# Patient Record
Sex: MALE | Race: WHITE | NOT HISPANIC OR LATINO | Employment: OTHER | URBAN - NONMETROPOLITAN AREA
[De-identification: names, ages, dates, MRNs, and addresses within clinical notes are randomized per-mention and may not be internally consistent; named-entity substitution may affect disease eponyms.]

---

## 2023-08-30 PROBLEM — I10 HYPERTENSION: Status: ACTIVE | Noted: 2023-08-30

## 2023-08-30 PROBLEM — E78.5 HYPERLIPIDEMIA: Status: ACTIVE | Noted: 2023-08-30

## 2023-08-30 PROBLEM — C61 PROSTATE CANCER (MULTI): Status: ACTIVE | Noted: 2023-08-30

## 2023-08-30 PROBLEM — I48.0 PAROXYSMAL ATRIAL FIBRILLATION (MULTI): Status: ACTIVE | Noted: 2023-08-30

## 2023-08-30 RX ORDER — SILDENAFIL 25 MG/1
25 TABLET, FILM COATED ORAL AS NEEDED
COMMUNITY
Start: 2021-04-20 | End: 2024-02-29 | Stop reason: ALTCHOICE

## 2023-08-30 RX ORDER — LISINOPRIL 2.5 MG/1
1 TABLET ORAL DAILY
COMMUNITY
Start: 2021-02-04

## 2023-08-30 RX ORDER — METOPROLOL TARTRATE 100 MG/1
1 TABLET ORAL 2 TIMES DAILY
COMMUNITY
Start: 2021-02-04 | End: 2024-02-29 | Stop reason: ALTCHOICE

## 2023-08-30 RX ORDER — CETIRIZINE HYDROCHLORIDE 10 MG/1
1 TABLET ORAL DAILY
COMMUNITY

## 2023-08-30 RX ORDER — ASPIRIN 81 MG/1
1 TABLET ORAL DAILY
COMMUNITY

## 2023-08-30 RX ORDER — MONTELUKAST SODIUM 10 MG/1
1 TABLET ORAL DAILY
COMMUNITY
Start: 2021-02-04

## 2023-08-30 RX ORDER — ATORVASTATIN CALCIUM 20 MG/1
1 TABLET, FILM COATED ORAL DAILY
COMMUNITY
Start: 2021-02-04

## 2023-10-06 ENCOUNTER — OFFICE VISIT (OUTPATIENT)
Dept: CARDIOLOGY | Facility: CLINIC | Age: 68
End: 2023-10-06
Payer: MEDICARE

## 2023-10-06 VITALS
HEIGHT: 74 IN | BODY MASS INDEX: 24.26 KG/M2 | DIASTOLIC BLOOD PRESSURE: 70 MMHG | HEART RATE: 66 BPM | SYSTOLIC BLOOD PRESSURE: 118 MMHG | WEIGHT: 189 LBS

## 2023-10-06 DIAGNOSIS — E78.5 HYPERLIPIDEMIA, UNSPECIFIED HYPERLIPIDEMIA TYPE: ICD-10-CM

## 2023-10-06 DIAGNOSIS — I48.0 PAROXYSMAL ATRIAL FIBRILLATION (MULTI): Primary | ICD-10-CM

## 2023-10-06 DIAGNOSIS — C61 PROSTATE CANCER (MULTI): ICD-10-CM

## 2023-10-06 DIAGNOSIS — I10 PRIMARY HYPERTENSION: ICD-10-CM

## 2023-10-06 PROCEDURE — 3008F BODY MASS INDEX DOCD: CPT | Performed by: INTERNAL MEDICINE

## 2023-10-06 PROCEDURE — 3078F DIAST BP <80 MM HG: CPT | Performed by: INTERNAL MEDICINE

## 2023-10-06 PROCEDURE — 1036F TOBACCO NON-USER: CPT | Performed by: INTERNAL MEDICINE

## 2023-10-06 PROCEDURE — 99213 OFFICE O/P EST LOW 20 MIN: CPT | Performed by: INTERNAL MEDICINE

## 2023-10-06 PROCEDURE — 1159F MED LIST DOCD IN RCRD: CPT | Performed by: INTERNAL MEDICINE

## 2023-10-06 PROCEDURE — 3074F SYST BP LT 130 MM HG: CPT | Performed by: INTERNAL MEDICINE

## 2023-10-06 ASSESSMENT — PATIENT HEALTH QUESTIONNAIRE - PHQ9
2. FEELING DOWN, DEPRESSED OR HOPELESS: NOT AT ALL
SUM OF ALL RESPONSES TO PHQ9 QUESTIONS 1 AND 2: 0
1. LITTLE INTEREST OR PLEASURE IN DOING THINGS: NOT AT ALL

## 2023-10-06 NOTE — PROGRESS NOTES
Subjective   Miguel Ángel Sykes is a 67 y.o. male       Chief Complaint    Follow-up          HPI   Patient is here for follow-up continue management for paroxysmal atrial fibrillation, hypertension, hyperlipidemia.  Since last time I saw him he reports he is feeling quite well.  He denies any complaint of chest pain, palpitation, lightheadedness, dizziness or syncope.  He reports functional class I.  He has been treated for prostate cancer.  Unfortunately his wife passed away from complication related to metastatic disease.     ASSESSMENT:      1. Paroxysmal atrial fibrillation. No recurrence. No recurrence in several years.  He elected for aspirin therapy due to relatively low CHADS2 vascular score and no recurrence  2. Hypertension, controlled.  3. Previous report of palpitation, completely resolved.  4. Hyperlipidemia, we will try to retrieve his recent lab  5. prostate cancer stable  6.  Patient reports his wife passed away from complication related to metastatic cancer  RECOMMENDATION:      1. The patient will remain on the same medication.  2. The patient was advised to notify me any change in cardiac status or symptoms.  3. We discussed anticoagulation, the patient was elected for aspirin therapy.  4. I will see him back in the office in 1 year with an EKG   5. I  will try to retrieve his recent lab     Review of Systems   All other systems reviewed and are negative.               Objective   Physical Exam  Constitutional:       Appearance: Normal appearance. He is normal weight.   HENT:      Nose: Nose normal.   Neck:      Vascular: No carotid bruit.   Cardiovascular:      Rate and Rhythm: Normal rate.      Pulses: Normal pulses.      Heart sounds: Normal heart sounds.   Pulmonary:      Effort: Pulmonary effort is normal.   Abdominal:      General: Bowel sounds are normal.      Palpations: Abdomen is soft.   Genitourinary:     Rectum: Normal.   Musculoskeletal:         General: Normal range of motion.       "Cervical back: Normal range of motion.      Right lower leg: No edema.      Left lower leg: No edema.   Skin:     General: Skin is warm and dry.   Neurological:      General: No focal deficit present.      Mental Status: He is alert.   Psychiatric:         Mood and Affect: Mood normal.         Behavior: Behavior normal.         Thought Content: Thought content normal.         Judgment: Judgment normal.         Visit Vitals  /70 (BP Location: Left arm, Patient Position: Sitting)   Pulse 66   Ht 1.88 m (6' 2\")   Wt 85.7 kg (189 lb)   BMI 24.27 kg/m²   Smoking Status Never   BSA 2.12 m²                 Assessment/Plan   There are no diagnoses linked to this encounter.   1. Paroxysmal atrial fibrillation (CMS/HCC)        2. Hyperlipidemia, unspecified hyperlipidemia type        3. Primary hypertension        4. Body mass index (BMI) of 24.0 to 24.9 in adult        5. Prostate cancer (CMS/HCC)            "

## 2023-10-06 NOTE — PATIENT INSTRUCTIONS
Please bring all medicines, vitamins, and herbal supplements with you when you come to the office.    Prescriptions will not be filled unless you are compliant with your follow up appointments or have a follow up appointment scheduled as per instruction of your physician. Refills should be requested at the time of your visit.     Retrieve lab from  [facility]  One year with EKG

## 2023-11-16 ENCOUNTER — TELEPHONE (OUTPATIENT)
Dept: CARDIOLOGY | Facility: CLINIC | Age: 68
End: 2023-11-16
Payer: MEDICARE

## 2023-11-16 DIAGNOSIS — I48.91 ATRIAL FIBRILLATION, UNSPECIFIED TYPE (MULTI): ICD-10-CM

## 2023-11-16 NOTE — TELEPHONE ENCOUNTER
Patient seen on consult at [facility] on 11/13/2023; Patient seen post cardioversion.  Awake alert and stable.  Suitable for discharge.  His current medical regimen in the chart is appropriate for discharge.  We will make sure prescriptions are sent (anticoagulants and sotalol).

## 2023-11-30 DIAGNOSIS — I48.0 PAROXYSMAL ATRIAL FIBRILLATION (MULTI): ICD-10-CM

## 2023-11-30 RX ORDER — SOTALOL HYDROCHLORIDE 120 MG/1
120 TABLET ORAL 2 TIMES DAILY
Qty: 180 TABLET | Refills: 3 | Status: SHIPPED | OUTPATIENT
Start: 2023-11-30 | End: 2024-02-29 | Stop reason: ALTCHOICE

## 2023-11-30 RX ORDER — APIXABAN 5 MG/1
5 TABLET, FILM COATED ORAL 2 TIMES DAILY
COMMUNITY
Start: 2023-11-16 | End: 2023-11-30 | Stop reason: SDUPTHER

## 2023-11-30 RX ORDER — APIXABAN 5 MG/1
5 TABLET, FILM COATED ORAL 2 TIMES DAILY
Qty: 60 TABLET | Refills: 0 | Status: SHIPPED | OUTPATIENT
Start: 2023-11-30 | End: 2024-01-11 | Stop reason: SDUPTHER

## 2023-11-30 RX ORDER — SOTALOL HYDROCHLORIDE 120 MG/1
120 TABLET ORAL 2 TIMES DAILY
COMMUNITY
Start: 2023-11-16 | End: 2023-11-30 | Stop reason: SDUPTHER

## 2024-01-11 DIAGNOSIS — I48.0 PAROXYSMAL ATRIAL FIBRILLATION (MULTI): ICD-10-CM

## 2024-01-11 RX ORDER — APIXABAN 5 MG/1
5 TABLET, FILM COATED ORAL 2 TIMES DAILY
Qty: 180 TABLET | Refills: 3 | Status: SHIPPED | OUTPATIENT
Start: 2024-01-11 | End: 2025-01-10

## 2024-01-25 ENCOUNTER — APPOINTMENT (OUTPATIENT)
Dept: CARDIOLOGY | Facility: CLINIC | Age: 69
End: 2024-01-25
Payer: MEDICARE

## 2024-02-29 ENCOUNTER — OFFICE VISIT (OUTPATIENT)
Dept: CARDIOLOGY | Facility: CLINIC | Age: 69
End: 2024-02-29
Payer: MEDICARE

## 2024-02-29 VITALS
HEIGHT: 74 IN | WEIGHT: 188 LBS | DIASTOLIC BLOOD PRESSURE: 88 MMHG | SYSTOLIC BLOOD PRESSURE: 134 MMHG | BODY MASS INDEX: 24.13 KG/M2 | HEART RATE: 64 BPM

## 2024-02-29 DIAGNOSIS — N52.9 ERECTILE DYSFUNCTION, UNSPECIFIED ERECTILE DYSFUNCTION TYPE: ICD-10-CM

## 2024-02-29 DIAGNOSIS — I10 PRIMARY HYPERTENSION: ICD-10-CM

## 2024-02-29 DIAGNOSIS — I48.91 ATRIAL FIBRILLATION, UNSPECIFIED TYPE (MULTI): ICD-10-CM

## 2024-02-29 DIAGNOSIS — I48.0 PAROXYSMAL ATRIAL FIBRILLATION (MULTI): Primary | ICD-10-CM

## 2024-02-29 DIAGNOSIS — E78.2 MIXED HYPERLIPIDEMIA: ICD-10-CM

## 2024-02-29 PROBLEM — N52.2 DRUG-INDUCED ERECTILE DYSFUNCTION: Status: ACTIVE | Noted: 2024-02-29

## 2024-02-29 PROCEDURE — 93000 ELECTROCARDIOGRAM COMPLETE: CPT | Performed by: INTERNAL MEDICINE

## 2024-02-29 PROCEDURE — 99214 OFFICE O/P EST MOD 30 MIN: CPT | Performed by: INTERNAL MEDICINE

## 2024-02-29 PROCEDURE — 1160F RVW MEDS BY RX/DR IN RCRD: CPT | Performed by: INTERNAL MEDICINE

## 2024-02-29 PROCEDURE — 3075F SYST BP GE 130 - 139MM HG: CPT | Performed by: INTERNAL MEDICINE

## 2024-02-29 PROCEDURE — 3008F BODY MASS INDEX DOCD: CPT | Performed by: INTERNAL MEDICINE

## 2024-02-29 PROCEDURE — 1159F MED LIST DOCD IN RCRD: CPT | Performed by: INTERNAL MEDICINE

## 2024-02-29 PROCEDURE — 1036F TOBACCO NON-USER: CPT | Performed by: INTERNAL MEDICINE

## 2024-02-29 PROCEDURE — 3079F DIAST BP 80-89 MM HG: CPT | Performed by: INTERNAL MEDICINE

## 2024-02-29 RX ORDER — SOTALOL HYDROCHLORIDE 80 MG/1
160 TABLET ORAL EVERY 12 HOURS
Qty: 360 TABLET | Refills: 1 | Status: SHIPPED | OUTPATIENT
Start: 2024-02-29

## 2024-02-29 RX ORDER — SILDENAFIL 25 MG/1
25 TABLET, FILM COATED ORAL DAILY PRN
Qty: 20 TABLET | Refills: 0 | Status: SHIPPED | OUTPATIENT
Start: 2024-02-29 | End: 2024-03-30

## 2024-02-29 RX ORDER — SOTALOL HYDROCHLORIDE 80 MG/1
160 TABLET ORAL EVERY 12 HOURS
COMMUNITY
Start: 2024-02-16 | End: 2024-02-29 | Stop reason: SDUPTHER

## 2024-02-29 NOTE — LETTER
February 29, 2024     Donnell Livingston DO  [address]  [facility]  [place] [address]    Patient: Miguel Ángel Sykes   YOB: 1955   Date of Visit: 2/29/2024       Dear Dr. Donnell Livingston, :    Thank you for referring Miguel Ángel Sykes to me for evaluation. Below are my notes for this consultation.  If you have questions, please do not hesitate to call me. I look forward to following your patient along with you.       Sincerely,     Zackary Carney MD      CC: No Recipients  ______________________________________________________________________________________    Subjective   Miguel Ángel Sykes is a 68 y.o. male       Chief Complaint    Follow-up          HPI          Patient is here for follow-up continue management for atrial fibrillation.  He was recently in the hospital on 2 occasion for atrial fibrillation.  Initially was started on sotalol 120 twice daily but had another recurrence and his sotalol was increased to 160 twice daily.  Since last time I saw him he reports he is feeling well.  He denies any cardiac complaint of chest pain, palpitation, lightheadedness, dizziness or syncope.  His only complaint is male erectile dysfunction which I suspect due to beta-blocker.    ASSESSMENT:      1.  Persistent atrial fibrillation with recent few recurrence requiring initiating treatment with sotalol and uptitrating the dose to 160 twice daily now in normal sinus rhythm with normal QTc interval  2. Hypertension, controlled.  3. Previous report of palpitation, completely resolved.  4. Hyperlipidemia, recent lab noted and reviewed with him  5. prostate cancer stable  6.  Erectile male dysfunction likely due to beta-blocker  7.  Long-term anticoagulation due to atrial fibrillation    RECOMMENDATION:      1. The patient will remain on the same medication.  2. The patient was advised to notify me any change in cardiac status or symptoms.  3.  I advised the patient to try  "Viagra 25 mg as needed.  He was alerted about the possibility of hypotension  4. I will see him back in the office in 6-month   5. I  will try to retrieve his recent hospitalization record     Review of Systems   All other systems reviewed and are negative.           Vitals:    02/29/24 1353   BP: 134/88   BP Location: Right arm   Patient Position: Sitting   Pulse: 64   Weight: 85.3 kg (188 lb)   Height: 1.88 m (6' 2\")      EKG done in office today      Objective   Physical Exam  Constitutional:       Appearance: Normal appearance.   HENT:      Nose: Nose normal.   Neck:      Vascular: No carotid bruit.   Cardiovascular:      Rate and Rhythm: Normal rate.      Pulses: Normal pulses.      Heart sounds: Normal heart sounds.   Pulmonary:      Effort: Pulmonary effort is normal.   Abdominal:      General: Bowel sounds are normal.      Palpations: Abdomen is soft.   Musculoskeletal:         General: Normal range of motion.      Cervical back: Normal range of motion.      Right lower leg: No edema.      Left lower leg: No edema.   Skin:     General: Skin is warm and dry.   Neurological:      General: No focal deficit present.      Mental Status: He is alert.   Psychiatric:         Mood and Affect: Mood normal.         Behavior: Behavior normal.         Thought Content: Thought content normal.         Judgment: Judgment normal.         Allergies  Influenza virus vaccines and Penicillins     Current Medications    Current Outpatient Medications:   •  aspirin 81 mg EC tablet, Take 1 tablet (81 mg) by mouth once daily., Disp: , Rfl:   •  atorvastatin (Lipitor) 20 mg tablet, Take 1 tablet (20 mg) by mouth once daily., Disp: , Rfl:   •  cetirizine (ZyrTEC) 10 mg tablet, Take 1 tablet (10 mg) by mouth once daily., Disp: , Rfl:   •  Eliquis 5 mg tablet, Take 1 tablet (5 mg) by mouth 2 times a day., Disp: 180 tablet, Rfl: 3  •  lisinopril 2.5 mg tablet, Take 1 tablet (2.5 mg) by mouth once daily., Disp: , Rfl:   •  montelukast " (Singulair) 10 mg tablet, Take 1 tablet (10 mg) by mouth once daily., Disp: , Rfl:   •  MULTIVITAMIN ORAL, Take 1 tablet by mouth once daily., Disp: , Rfl:   •  sotalol (Betapace) 80 mg tablet, 2 tablets (160 mg) every 12 hours., Disp: , Rfl:                      Assessment/Plan   1. Paroxysmal atrial fibrillation (CMS/HCC)        2. Atrial fibrillation, unspecified type (CMS/HCC)  Follow Up In Cardiology      3. Mixed hyperlipidemia        4. Primary hypertension        5. BMI 24.0-24.9, adult        6. Erectile dysfunction, unspecified erectile dysfunction type                 Scribe Attestation  By signing my name below, I, Mindy AMADOR LPN  , Scribe   attest that this documentation has been prepared under the direction and in the presence of Zackary Carney MD.     Provider Attestation - Scribe documentation    All medical record entries made by the Scribe were at my direction and personally dictated by me. I have reviewed the chart and agree that the record accurately reflects my personal performance of the history, physical exam, discussion and plan.

## 2024-02-29 NOTE — PROGRESS NOTES
"Subjective   Miguel Ángel Sykes is a 68 y.o. male       Chief Complaint    Follow-up          HPI          Patient is here for follow-up continue management for atrial fibrillation.  He was recently in the hospital on 2 occasion for atrial fibrillation.  Initially was started on sotalol 120 twice daily but had another recurrence and his sotalol was increased to 160 twice daily.  Since last time I saw him he reports he is feeling well.  He denies any cardiac complaint of chest pain, palpitation, lightheadedness, dizziness or syncope.  His only complaint is male erectile dysfunction which I suspect due to beta-blocker.    ASSESSMENT:      1.  Persistent atrial fibrillation with recent few recurrence requiring initiating treatment with sotalol and uptitrating the dose to 160 twice daily now in normal sinus rhythm with normal QTc interval  2. Hypertension, controlled.  3. Previous report of palpitation, completely resolved.  4. Hyperlipidemia, recent lab noted and reviewed with him  5. prostate cancer stable  6.  Erectile male dysfunction likely due to beta-blocker  7.  Long-term anticoagulation due to atrial fibrillation    RECOMMENDATION:      1. The patient will remain on the same medication.  2. The patient was advised to notify me any change in cardiac status or symptoms.  3.  I advised the patient to try Viagra 25 mg as needed.  He was alerted about the possibility of hypotension  4. I will see him back in the office in 6-month   5. I  will try to retrieve his recent hospitalization record     Review of Systems   All other systems reviewed and are negative.           Vitals:    02/29/24 1353   BP: 134/88   BP Location: Right arm   Patient Position: Sitting   Pulse: 64   Weight: 85.3 kg (188 lb)   Height: 1.88 m (6' 2\")      EKG done in office today      Objective   Physical Exam  Constitutional:       Appearance: Normal appearance.   HENT:      Nose: Nose normal.   Neck:      Vascular: No carotid bruit. "   Cardiovascular:      Rate and Rhythm: Normal rate.      Pulses: Normal pulses.      Heart sounds: Normal heart sounds.   Pulmonary:      Effort: Pulmonary effort is normal.   Abdominal:      General: Bowel sounds are normal.      Palpations: Abdomen is soft.   Musculoskeletal:         General: Normal range of motion.      Cervical back: Normal range of motion.      Right lower leg: No edema.      Left lower leg: No edema.   Skin:     General: Skin is warm and dry.   Neurological:      General: No focal deficit present.      Mental Status: He is alert.   Psychiatric:         Mood and Affect: Mood normal.         Behavior: Behavior normal.         Thought Content: Thought content normal.         Judgment: Judgment normal.         Allergies  Influenza virus vaccines and Penicillins     Current Medications    Current Outpatient Medications:     aspirin 81 mg EC tablet, Take 1 tablet (81 mg) by mouth once daily., Disp: , Rfl:     atorvastatin (Lipitor) 20 mg tablet, Take 1 tablet (20 mg) by mouth once daily., Disp: , Rfl:     cetirizine (ZyrTEC) 10 mg tablet, Take 1 tablet (10 mg) by mouth once daily., Disp: , Rfl:     Eliquis 5 mg tablet, Take 1 tablet (5 mg) by mouth 2 times a day., Disp: 180 tablet, Rfl: 3    lisinopril 2.5 mg tablet, Take 1 tablet (2.5 mg) by mouth once daily., Disp: , Rfl:     montelukast (Singulair) 10 mg tablet, Take 1 tablet (10 mg) by mouth once daily., Disp: , Rfl:     MULTIVITAMIN ORAL, Take 1 tablet by mouth once daily., Disp: , Rfl:     sotalol (Betapace) 80 mg tablet, 2 tablets (160 mg) every 12 hours., Disp: , Rfl:                      Assessment/Plan   1. Paroxysmal atrial fibrillation (CMS/HCC)        2. Atrial fibrillation, unspecified type (CMS/HCC)  Follow Up In Cardiology      3. Mixed hyperlipidemia        4. Primary hypertension        5. BMI 24.0-24.9, adult        6. Erectile dysfunction, unspecified erectile dysfunction type                 Scribe Attestation  By signing my  name below, I, Mindy AMADOR LPN  , Debiibe   attest that this documentation has been prepared under the direction and in the presence of Zackary Carney MD.     Provider Attestation - Scribe documentation    All medical record entries made by the Scribe were at my direction and personally dictated by me. I have reviewed the chart and agree that the record accurately reflects my personal performance of the history, physical exam, discussion and plan.

## 2024-08-16 ENCOUNTER — APPOINTMENT (OUTPATIENT)
Dept: CARDIOLOGY | Facility: CLINIC | Age: 69
End: 2024-08-16
Payer: MEDICARE

## 2024-08-16 VITALS
HEIGHT: 74 IN | HEART RATE: 65 BPM | WEIGHT: 188 LBS | BODY MASS INDEX: 24.13 KG/M2 | SYSTOLIC BLOOD PRESSURE: 102 MMHG | DIASTOLIC BLOOD PRESSURE: 80 MMHG

## 2024-08-16 DIAGNOSIS — I10 PRIMARY HYPERTENSION: ICD-10-CM

## 2024-08-16 DIAGNOSIS — E78.2 MIXED HYPERLIPIDEMIA: ICD-10-CM

## 2024-08-16 DIAGNOSIS — I48.0 PAROXYSMAL ATRIAL FIBRILLATION (MULTI): Primary | ICD-10-CM

## 2024-08-16 DIAGNOSIS — Z78.9 NEVER SMOKED ANY SUBSTANCE: ICD-10-CM

## 2024-08-16 PROCEDURE — 3008F BODY MASS INDEX DOCD: CPT | Performed by: INTERNAL MEDICINE

## 2024-08-16 PROCEDURE — 99214 OFFICE O/P EST MOD 30 MIN: CPT | Performed by: INTERNAL MEDICINE

## 2024-08-16 PROCEDURE — 1159F MED LIST DOCD IN RCRD: CPT | Performed by: INTERNAL MEDICINE

## 2024-08-16 PROCEDURE — 3074F SYST BP LT 130 MM HG: CPT | Performed by: INTERNAL MEDICINE

## 2024-08-16 PROCEDURE — 3079F DIAST BP 80-89 MM HG: CPT | Performed by: INTERNAL MEDICINE

## 2024-08-16 PROCEDURE — 93000 ELECTROCARDIOGRAM COMPLETE: CPT | Performed by: INTERNAL MEDICINE

## 2024-08-16 PROCEDURE — 1160F RVW MEDS BY RX/DR IN RCRD: CPT | Performed by: INTERNAL MEDICINE

## 2024-08-16 PROCEDURE — 1036F TOBACCO NON-USER: CPT | Performed by: INTERNAL MEDICINE

## 2024-08-16 RX ORDER — SOTALOL HYDROCHLORIDE 120 MG/1
120 TABLET ORAL EVERY 12 HOURS
COMMUNITY

## 2024-08-16 NOTE — LETTER
August 16, 2024     Donnell Livingston DO  [address]  [facility]  [place] [address]    Patient: Miguel Ángel Sykes   YOB: 1955   Date of Visit: 8/16/2024       Dear Dr. Donnell Livingston, :    Thank you for referring Miguel Ángel Sykes to me for evaluation. Below are my notes for this consultation.  If you have questions, please do not hesitate to call me. I look forward to following your patient along with you.       Sincerely,     Zackary Carney MD      CC: No Recipients  ______________________________________________________________________________________    Subjective   Miguel Ángel Sykes is a 68 y.o. male       Chief Complaint    Follow-up          HPI            Patient is here for follow-up to management for persistent atrial fibrillation, hypertension and hyperlipidemia.  Since last time I saw him he denies any cardiac complaint of chest pain, palpitation, lightheadedness, dizziness or syncope.  He remains active.  His recent lab noted and reviewed with him.  His EKG today showed sinus rhythm with a QTc interval of 440 ms.    ASSESSMENT:      1.  Persistent atrial fibrillation with recent few recurrence requiring initiating treatment with sotalol and uptitrating the dose to 120 twice daily now in normal sinus rhythm with normal QTc interval  2. Hypertension, controlled.  3. Previous report of palpitation, completely resolved.  4. Hyperlipidemia, recent lab noted and reviewed with him  5. prostate cancer stable  6.  Erectile male dysfunction likely due to beta-blocker had used Viagra recently with no side effect  7.  Long-term anticoagulation due to atrial fibrillation     RECOMMENDATION:      1. The patient will remain on the same medication.  2. The patient was advised to notify me any change in cardiac status or symptoms.  3.  I reviewed his recent lab and EKG  4. I will see him back in the office in 6-month         Review of Systems   All other systems  "reviewed and are negative.           Vitals:    08/16/24 0919   BP: 102/80   BP Location: Left arm   Patient Position: Sitting   Pulse: 65   Weight: 85.3 kg (188 lb)   Height: 1.88 m (6' 2\")    EKG done in office today      Objective   Physical Exam  Constitutional:       Appearance: Normal appearance.   HENT:      Nose: Nose normal.   Neck:      Vascular: No carotid bruit.   Cardiovascular:      Rate and Rhythm: Normal rate.      Pulses: Normal pulses.      Heart sounds: Normal heart sounds.   Pulmonary:      Effort: Pulmonary effort is normal.   Abdominal:      General: Bowel sounds are normal.      Palpations: Abdomen is soft.   Musculoskeletal:         General: Normal range of motion.      Cervical back: Normal range of motion.      Right lower leg: No edema.      Left lower leg: No edema.   Skin:     General: Skin is warm and dry.   Neurological:      General: No focal deficit present.      Mental Status: He is alert.   Psychiatric:         Mood and Affect: Mood normal.         Behavior: Behavior normal.         Thought Content: Thought content normal.         Judgment: Judgment normal.         Allergies  Influenza virus vaccines and Penicillins     Current Medications    Current Outpatient Medications:   •  aspirin 81 mg EC tablet, Take 1 tablet (81 mg) by mouth once daily., Disp: , Rfl:   •  atorvastatin (Lipitor) 20 mg tablet, Take 1 tablet (20 mg) by mouth once daily., Disp: , Rfl:   •  cetirizine (ZyrTEC) 10 mg tablet, Take 1 tablet (10 mg) by mouth once daily., Disp: , Rfl:   •  Eliquis 5 mg tablet, Take 1 tablet (5 mg) by mouth 2 times a day., Disp: 180 tablet, Rfl: 3  •  lisinopril 2.5 mg tablet, Take 1 tablet (2.5 mg) by mouth once daily., Disp: , Rfl:   •  montelukast (Singulair) 10 mg tablet, Take 1 tablet (10 mg) by mouth once daily., Disp: , Rfl:   •  MULTIVITAMIN ORAL, Take 1 tablet by mouth once daily., Disp: , Rfl:   •  sildenafil (Viagra) 25 mg tablet, Take 1 tablet (25 mg) by mouth once daily " as needed for erectile dysfunction., Disp: 20 tablet, Rfl: 0  •  sotalol (Betapace) 120 mg tablet, Take 1 tablet (120 mg) by mouth every 12 hours., Disp: , Rfl:   •  sotalol (Betapace) 80 mg tablet, Take 2 tablets (160 mg) by mouth every 12 hours., Disp: 360 tablet, Rfl: 1                     Assessment/Plan   1. Paroxysmal atrial fibrillation (Multi)  Follow Up In Cardiology      2. Primary hypertension  Follow Up In Cardiology      3. Mixed hyperlipidemia        4. BMI 24.0-24.9, adult        5. Never smoked any substance                 Scribe Attestation  By signing my name below, I, Abigail MARSHALL RN   , Scribe   attest that this documentation has been prepared under the direction and in the presence of Zackary Carney MD.     Provider Attestation - Scribe documentation    All medical record entries made by the Scribe were at my direction and personally dictated by me. I have reviewed the chart and agree that the record accurately reflects my personal performance of the history, physical exam, discussion and plan.

## 2024-08-16 NOTE — PROGRESS NOTES
"Subjective   Miguel Ángel Sykes is a 68 y.o. male       Chief Complaint    Follow-up          HPI            Patient is here for follow-up to management for persistent atrial fibrillation, hypertension and hyperlipidemia.  Since last time I saw him he denies any cardiac complaint of chest pain, palpitation, lightheadedness, dizziness or syncope.  He remains active.  His recent lab noted and reviewed with him.  His EKG today showed sinus rhythm with a QTc interval of 440 ms.    ASSESSMENT:      1.  Persistent atrial fibrillation with recent few recurrence requiring initiating treatment with sotalol and uptitrating the dose to 120 twice daily now in normal sinus rhythm with normal QTc interval  2. Hypertension, controlled.  3. Previous report of palpitation, completely resolved.  4. Hyperlipidemia, recent lab noted and reviewed with him  5. prostate cancer stable  6.  Erectile male dysfunction likely due to beta-blocker had used Viagra recently with no side effect  7.  Long-term anticoagulation due to atrial fibrillation     RECOMMENDATION:      1. The patient will remain on the same medication.  2. The patient was advised to notify me any change in cardiac status or symptoms.  3.  I reviewed his recent lab and EKG  4. I will see him back in the office in 6-month         Review of Systems   All other systems reviewed and are negative.           Vitals:    08/16/24 0919   BP: 102/80   BP Location: Left arm   Patient Position: Sitting   Pulse: 65   Weight: 85.3 kg (188 lb)   Height: 1.88 m (6' 2\")    EKG done in office today      Objective   Physical Exam  Constitutional:       Appearance: Normal appearance.   HENT:      Nose: Nose normal.   Neck:      Vascular: No carotid bruit.   Cardiovascular:      Rate and Rhythm: Normal rate.      Pulses: Normal pulses.      Heart sounds: Normal heart sounds.   Pulmonary:      Effort: Pulmonary effort is normal.   Abdominal:      General: Bowel sounds are normal.      Palpations: " Abdomen is soft.   Musculoskeletal:         General: Normal range of motion.      Cervical back: Normal range of motion.      Right lower leg: No edema.      Left lower leg: No edema.   Skin:     General: Skin is warm and dry.   Neurological:      General: No focal deficit present.      Mental Status: He is alert.   Psychiatric:         Mood and Affect: Mood normal.         Behavior: Behavior normal.         Thought Content: Thought content normal.         Judgment: Judgment normal.         Allergies  Influenza virus vaccines and Penicillins     Current Medications    Current Outpatient Medications:     aspirin 81 mg EC tablet, Take 1 tablet (81 mg) by mouth once daily., Disp: , Rfl:     atorvastatin (Lipitor) 20 mg tablet, Take 1 tablet (20 mg) by mouth once daily., Disp: , Rfl:     cetirizine (ZyrTEC) 10 mg tablet, Take 1 tablet (10 mg) by mouth once daily., Disp: , Rfl:     Eliquis 5 mg tablet, Take 1 tablet (5 mg) by mouth 2 times a day., Disp: 180 tablet, Rfl: 3    lisinopril 2.5 mg tablet, Take 1 tablet (2.5 mg) by mouth once daily., Disp: , Rfl:     montelukast (Singulair) 10 mg tablet, Take 1 tablet (10 mg) by mouth once daily., Disp: , Rfl:     MULTIVITAMIN ORAL, Take 1 tablet by mouth once daily., Disp: , Rfl:     sildenafil (Viagra) 25 mg tablet, Take 1 tablet (25 mg) by mouth once daily as needed for erectile dysfunction., Disp: 20 tablet, Rfl: 0    sotalol (Betapace) 120 mg tablet, Take 1 tablet (120 mg) by mouth every 12 hours., Disp: , Rfl:     sotalol (Betapace) 80 mg tablet, Take 2 tablets (160 mg) by mouth every 12 hours., Disp: 360 tablet, Rfl: 1                     Assessment/Plan   1. Paroxysmal atrial fibrillation (Multi)  Follow Up In Cardiology      2. Primary hypertension  Follow Up In Cardiology      3. Mixed hyperlipidemia        4. BMI 24.0-24.9, adult        5. Never smoked any substance                 Scribe Attestation  By signing my name below, Abigail ZABALA RN   , Scribe   attest  that this documentation has been prepared under the direction and in the presence of Zackary Carney MD.     Provider Attestation - Scribe documentation    All medical record entries made by the Scribe were at my direction and personally dictated by me. I have reviewed the chart and agree that the record accurately reflects my personal performance of the history, physical exam, discussion and plan.

## 2024-10-11 ENCOUNTER — APPOINTMENT (OUTPATIENT)
Dept: CARDIOLOGY | Facility: CLINIC | Age: 69
End: 2024-10-11
Payer: MEDICARE

## 2025-01-07 DIAGNOSIS — I48.0 PAROXYSMAL ATRIAL FIBRILLATION (MULTI): ICD-10-CM

## 2025-01-07 RX ORDER — APIXABAN 5 MG/1
5 TABLET, FILM COATED ORAL 2 TIMES DAILY
Qty: 180 TABLET | Refills: 3 | Status: SHIPPED | OUTPATIENT
Start: 2025-01-07 | End: 2026-01-07

## 2025-02-10 ENCOUNTER — APPOINTMENT (OUTPATIENT)
Dept: CARDIOLOGY | Facility: CLINIC | Age: 70
End: 2025-02-10
Payer: MEDICARE

## 2025-02-17 DIAGNOSIS — I48.0 PAROXYSMAL ATRIAL FIBRILLATION (MULTI): ICD-10-CM

## 2025-02-18 RX ORDER — SOTALOL HYDROCHLORIDE 120 MG/1
120 TABLET ORAL EVERY 12 HOURS
Qty: 180 TABLET | Refills: 0 | Status: SHIPPED | OUTPATIENT
Start: 2025-02-18 | End: 2026-02-18

## 2025-02-24 ENCOUNTER — APPOINTMENT (OUTPATIENT)
Dept: CARDIOLOGY | Facility: CLINIC | Age: 70
End: 2025-02-24
Payer: MEDICARE

## 2025-02-24 VITALS
DIASTOLIC BLOOD PRESSURE: 68 MMHG | WEIGHT: 192 LBS | HEIGHT: 74 IN | SYSTOLIC BLOOD PRESSURE: 114 MMHG | HEART RATE: 68 BPM | BODY MASS INDEX: 24.64 KG/M2

## 2025-02-24 DIAGNOSIS — Z78.9 NEVER SMOKED ANY SUBSTANCE: ICD-10-CM

## 2025-02-24 DIAGNOSIS — N52.2 DRUG-INDUCED ERECTILE DYSFUNCTION: ICD-10-CM

## 2025-02-24 DIAGNOSIS — I10 PRIMARY HYPERTENSION: ICD-10-CM

## 2025-02-24 DIAGNOSIS — I48.0 PAROXYSMAL ATRIAL FIBRILLATION (MULTI): ICD-10-CM

## 2025-02-24 DIAGNOSIS — E78.2 MIXED HYPERLIPIDEMIA: ICD-10-CM

## 2025-02-24 PROCEDURE — 3008F BODY MASS INDEX DOCD: CPT | Performed by: INTERNAL MEDICINE

## 2025-02-24 PROCEDURE — 93000 ELECTROCARDIOGRAM COMPLETE: CPT | Performed by: INTERNAL MEDICINE

## 2025-02-24 PROCEDURE — 1036F TOBACCO NON-USER: CPT | Performed by: INTERNAL MEDICINE

## 2025-02-24 PROCEDURE — 3078F DIAST BP <80 MM HG: CPT | Performed by: INTERNAL MEDICINE

## 2025-02-24 PROCEDURE — G2211 COMPLEX E/M VISIT ADD ON: HCPCS | Performed by: INTERNAL MEDICINE

## 2025-02-24 PROCEDURE — 1160F RVW MEDS BY RX/DR IN RCRD: CPT | Performed by: INTERNAL MEDICINE

## 2025-02-24 PROCEDURE — 99214 OFFICE O/P EST MOD 30 MIN: CPT | Performed by: INTERNAL MEDICINE

## 2025-02-24 PROCEDURE — 1159F MED LIST DOCD IN RCRD: CPT | Performed by: INTERNAL MEDICINE

## 2025-02-24 PROCEDURE — 3074F SYST BP LT 130 MM HG: CPT | Performed by: INTERNAL MEDICINE

## 2025-02-24 RX ORDER — SOTALOL HYDROCHLORIDE 120 MG/1
120 TABLET ORAL EVERY 12 HOURS
Qty: 180 TABLET | Refills: 3 | Status: SHIPPED | OUTPATIENT
Start: 2025-02-24 | End: 2026-02-24

## 2025-02-24 NOTE — LETTER
February 24, 2025     Donnell Livingston DO  [address]  [facility]  [place] [address]    Patient: Miguel Ángel Sykes   YOB: 1955   Date of Visit: 2/24/2025       Dear Dr. Donnell Livingston, :    Thank you for referring Miguel Ángel Sykes to me for evaluation. Below are my notes for this consultation.  If you have questions, please do not hesitate to call me. I look forward to following your patient along with you.       Sincerely,     Zackary Carney MD      CC: No Recipients  ______________________________________________________________________________________    Subjective   Miguel Ángel Sykes is a 69 y.o. male       Chief Complaint    Follow-up          HPI           Patient is here for follow-up to management for persistent atrial fibrillation, hypertension and hyperlipidemia.  Since last time I saw him he reports he is feeling well.  He denies complaint of chest pain, palpitation, lightheadedness, dizziness or syncope.  Apparently he was not taking his sotalol accurately and instead of taking 1-1/2 tablet twice a daily of 80 mg is taking 2 tablet twice daily.  His new prescription was 420 mg twice daily which is the accurate dose.  He feels well.  He denies any breakthrough arrhythmia.  His EKG showed normal sinus rhythm with normal QTc interval.    ASSESSMENT:      1.  Persistent atrial fibrillation maintaining sinus rhythm with sotalol 120 mg twice daily with normal QTc interval  2. Hypertension, controlled.  3. Previous report of palpitation, completely resolved.  4. Hyperlipidemia, recent lab noted and reviewed with him  5. prostate cancer stable  6.  Erectile male dysfunction likely due to beta-blocker had used Viagra recently with no side effect  7.  Long-term anticoagulation due to atrial fibrillation     RECOMMENDATION:      1. The patient will remain on the same medication.  2. The patient was advised to notify me any change in cardiac status or  "symptoms.  3.  I reviewed his recent lab and EKG  4. I will see him back in the office in 6-month  Review of Systems   All other systems reviewed and are negative.           Vitals:    02/24/25 0856   BP: 114/68   BP Location: Left arm   Patient Position: Sitting   Pulse: 68   Weight: 87.1 kg (192 lb)   Height: 1.88 m (6' 2\")      EKG done in office today   Objective   Physical Exam  Constitutional:       Appearance: Normal appearance.   HENT:      Nose: Nose normal.   Neck:      Vascular: No carotid bruit.   Cardiovascular:      Rate and Rhythm: Normal rate.      Pulses: Normal pulses.      Heart sounds: Normal heart sounds.   Pulmonary:      Effort: Pulmonary effort is normal.   Abdominal:      General: Bowel sounds are normal.      Palpations: Abdomen is soft.   Musculoskeletal:         General: Normal range of motion.      Cervical back: Normal range of motion.      Right lower leg: No edema.      Left lower leg: No edema.   Skin:     General: Skin is warm and dry.   Neurological:      General: No focal deficit present.      Mental Status: He is alert.   Psychiatric:         Mood and Affect: Mood normal.         Behavior: Behavior normal.         Thought Content: Thought content normal.         Judgment: Judgment normal.         Allergies  Influenza virus vaccines and Penicillins     Current Medications    Current Outpatient Medications:   •  atorvastatin (Lipitor) 20 mg tablet, Take 1 tablet (20 mg) by mouth once daily., Disp: , Rfl:   •  cetirizine (ZyrTEC) 10 mg tablet, Take 1 tablet (10 mg) by mouth once daily., Disp: , Rfl:   •  Eliquis 5 mg tablet, Take 1 tablet (5 mg) by mouth 2 times a day., Disp: 180 tablet, Rfl: 3  •  lisinopril 2.5 mg tablet, Take 1 tablet (2.5 mg) by mouth once daily., Disp: , Rfl:   •  montelukast (Singulair) 10 mg tablet, Take 1 tablet (10 mg) by mouth once daily., Disp: , Rfl:   •  MULTIVITAMIN ORAL, Take 1 tablet by mouth once daily., Disp: , Rfl:   •  sildenafil (Viagra) 25 mg " tablet, Take 1 tablet (25 mg) by mouth once daily as needed for erectile dysfunction., Disp: 20 tablet, Rfl: 0  •  sotalol (Betapace) 120 mg tablet, Take 1 tablet (120 mg) by mouth every 12 hours. (Patient taking differently: Take 160 mg by mouth every 12 hours.), Disp: 180 tablet, Rfl: 0                     Assessment/Plan   1. Paroxysmal atrial fibrillation (Multi)  Follow Up In Cardiology      2. Primary hypertension        3. Mixed hyperlipidemia        4. Drug-induced erectile dysfunction        5. Never smoked any substance        6. BMI 24.0-24.9, adult                 Scribe Attestation  By signing my name below, I, Mindy AMADOR LPN  , Scribe   attest that this documentation has been prepared under the direction and in the presence of Zackary Carney MD.     Provider Attestation - Scribe documentation    All medical record entries made by the Scribe were at my direction and personally dictated by me. I have reviewed the chart and agree that the record accurately reflects my personal performance of the history, physical exam, discussion and plan.

## 2025-02-24 NOTE — PROGRESS NOTES
"Subjective   Miguel Ángel Sykes is a 69 y.o. male       Chief Complaint    Follow-up          HPI           Patient is here for follow-up to management for persistent atrial fibrillation, hypertension and hyperlipidemia.  Since last time I saw him he reports he is feeling well.  He denies complaint of chest pain, palpitation, lightheadedness, dizziness or syncope.  Apparently he was not taking his sotalol accurately and instead of taking 1-1/2 tablet twice a daily of 80 mg is taking 2 tablet twice daily.  His new prescription was 420 mg twice daily which is the accurate dose.  He feels well.  He denies any breakthrough arrhythmia.  His EKG showed normal sinus rhythm with normal QTc interval.    ASSESSMENT:      1.  Persistent atrial fibrillation maintaining sinus rhythm with sotalol 120 mg twice daily with normal QTc interval  2. Hypertension, controlled.  3. Previous report of palpitation, completely resolved.  4. Hyperlipidemia, recent lab noted and reviewed with him  5. prostate cancer stable  6.  Erectile male dysfunction likely due to beta-blocker had used Viagra recently with no side effect  7.  Long-term anticoagulation due to atrial fibrillation     RECOMMENDATION:      1. The patient will remain on the same medication.  2. The patient was advised to notify me any change in cardiac status or symptoms.  3.  I reviewed his recent lab and EKG  4. I will see him back in the office in 6-month  Review of Systems   All other systems reviewed and are negative.           Vitals:    02/24/25 0856   BP: 114/68   BP Location: Left arm   Patient Position: Sitting   Pulse: 68   Weight: 87.1 kg (192 lb)   Height: 1.88 m (6' 2\")      EKG done in office today   Objective   Physical Exam  Constitutional:       Appearance: Normal appearance.   HENT:      Nose: Nose normal.   Neck:      Vascular: No carotid bruit.   Cardiovascular:      Rate and Rhythm: Normal rate.      Pulses: Normal pulses.      Heart sounds: Normal heart " sounds.   Pulmonary:      Effort: Pulmonary effort is normal.   Abdominal:      General: Bowel sounds are normal.      Palpations: Abdomen is soft.   Musculoskeletal:         General: Normal range of motion.      Cervical back: Normal range of motion.      Right lower leg: No edema.      Left lower leg: No edema.   Skin:     General: Skin is warm and dry.   Neurological:      General: No focal deficit present.      Mental Status: He is alert.   Psychiatric:         Mood and Affect: Mood normal.         Behavior: Behavior normal.         Thought Content: Thought content normal.         Judgment: Judgment normal.         Allergies  Influenza virus vaccines and Penicillins     Current Medications    Current Outpatient Medications:     atorvastatin (Lipitor) 20 mg tablet, Take 1 tablet (20 mg) by mouth once daily., Disp: , Rfl:     cetirizine (ZyrTEC) 10 mg tablet, Take 1 tablet (10 mg) by mouth once daily., Disp: , Rfl:     Eliquis 5 mg tablet, Take 1 tablet (5 mg) by mouth 2 times a day., Disp: 180 tablet, Rfl: 3    lisinopril 2.5 mg tablet, Take 1 tablet (2.5 mg) by mouth once daily., Disp: , Rfl:     montelukast (Singulair) 10 mg tablet, Take 1 tablet (10 mg) by mouth once daily., Disp: , Rfl:     MULTIVITAMIN ORAL, Take 1 tablet by mouth once daily., Disp: , Rfl:     sildenafil (Viagra) 25 mg tablet, Take 1 tablet (25 mg) by mouth once daily as needed for erectile dysfunction., Disp: 20 tablet, Rfl: 0    sotalol (Betapace) 120 mg tablet, Take 1 tablet (120 mg) by mouth every 12 hours. (Patient taking differently: Take 160 mg by mouth every 12 hours.), Disp: 180 tablet, Rfl: 0                     Assessment/Plan   1. Paroxysmal atrial fibrillation (Multi)  Follow Up In Cardiology      2. Primary hypertension        3. Mixed hyperlipidemia        4. Drug-induced erectile dysfunction        5. Never smoked any substance        6. BMI 24.0-24.9, adult                 Scribe Attestation  By signing my name below, I,  Mindy AMADOR LPN  , Scribe   attest that this documentation has been prepared under the direction and in the presence of Zackary Carney MD.     Provider Attestation - Scribe documentation    All medical record entries made by the Scribe were at my direction and personally dictated by me. I have reviewed the chart and agree that the record accurately reflects my personal performance of the history, physical exam, discussion and plan.

## 2025-10-03 ENCOUNTER — APPOINTMENT (OUTPATIENT)
Dept: CARDIOLOGY | Facility: CLINIC | Age: 70
End: 2025-10-03
Payer: MEDICARE